# Patient Record
Sex: MALE | Race: WHITE | NOT HISPANIC OR LATINO | Employment: FULL TIME | URBAN - METROPOLITAN AREA
[De-identification: names, ages, dates, MRNs, and addresses within clinical notes are randomized per-mention and may not be internally consistent; named-entity substitution may affect disease eponyms.]

---

## 2020-11-13 ENCOUNTER — OFFICE VISIT (OUTPATIENT)
Dept: ORTHOPEDIC SURGERY | Facility: CLINIC | Age: 40
End: 2020-11-13

## 2020-11-13 VITALS — HEART RATE: 80 BPM | BODY MASS INDEX: 26.37 KG/M2 | OXYGEN SATURATION: 98 % | WEIGHT: 184.2 LBS | HEIGHT: 70 IN

## 2020-11-13 DIAGNOSIS — M25.521 RIGHT ELBOW PAIN: ICD-10-CM

## 2020-11-13 DIAGNOSIS — M77.01 MEDIAL EPICONDYLITIS OF RIGHT ELBOW: Primary | ICD-10-CM

## 2020-11-13 PROCEDURE — 99203 OFFICE O/P NEW LOW 30 MIN: CPT | Performed by: ORTHOPAEDIC SURGERY

## 2020-11-13 NOTE — PROGRESS NOTES
[facility] [place] - Buddy Cuenca MD    Office Visit       Patient Name: Zach Edomnds    Chief Complaint:   Chief Complaint   Patient presents with   • Right Elbow - Pain       Referring Physician: No ref. provider found    History of Present Illness:   Zach Edmonds is a 40 y.o. male who presents with right body part: elbow Reason: pain.  Onset:Onset: atraumatic and gradual in nature. The issue has been ongoing for 3 year(s). Pain is a 6/10 on the pain scale. Pain is described as Pain Characterization: dull, aching and throbbing. Associated symptoms include Symptoms: pain and stiffness. The pain is worse with sleeping and leisure; ice improve the pain. Previous treatments have included: bracing and physical therapy. I have reviewed the patient's history of present illness as noted/entered above.    I have reviewed the patient's past medical history, surgical history, social history, family history, medications, and allergies as noted in the electronic medical record and as noted/entered.  I have reviewed the patient's review of systems as noted/enter and updated as noted in the patient's HPI.      RIGHT ELBOW pain  3 years  Pain and stiffness  Affecting sleep  Treated with bracing and physical therapy  MRI in the past 2019 prior to surgical procedure  Saw Dr. Wilson in the past -- Surgery with Tenex with Dr. Wilson -- September 2019  Prior MRI predated surgical procedure  Possible kettle bell injury, noted playing golf  Medial elbow pain  Voltaren gel  Took off of golf and still painful when tried back    Refractory pain right elbow despite conservative measures and surgical measures.  MRI right elbow recommended    Ballast    Subjective   Subjective      Review of Systems   Constitutional: Negative.  Negative for chills, fatigue and fever.   HENT: Negative.  Negative for congestion and dental problem.    Eyes: Negative.   "Negative for blurred vision.   Respiratory: Negative.  Negative for shortness of breath.    Cardiovascular: Negative.  Negative for leg swelling.   Gastrointestinal: Negative.  Negative for abdominal pain.   Endocrine: Negative.  Negative for polyuria.   Genitourinary: Negative.  Negative for difficulty urinating.   Musculoskeletal: Positive for arthralgias.   Skin: Negative.    Allergic/Immunologic: Negative.    Neurological: Negative.    Hematological: Negative.  Negative for adenopathy.   Psychiatric/Behavioral: Negative.  Negative for behavioral problems.        Past Medical History: History reviewed. No pertinent past medical history.    Past Surgical History:   Past Surgical History:   Procedure Laterality Date   • ELBOW PROCEDURE Right 10/2020       Family History:   Family History   Problem Relation Age of Onset   • Cancer Father        Social History:   Social History     Socioeconomic History   • Marital status:      Spouse name: Not on file   • Number of children: Not on file   • Years of education: Not on file   • Highest education level: Not on file   Tobacco Use   • Smoking status: Never Smoker   • Smokeless tobacco: Never Used   Substance and Sexual Activity   • Alcohol use: Yes     Alcohol/week: 3.0 standard drinks     Types: 3 Cans of beer per week   • Drug use: Never   • Sexual activity: Defer       Medications: No current outpatient medications on file.    Allergies: No Known Allergies    The following portions of the patient's history were reviewed and updated as appropriate: allergies, current medications, past family history, past medical history, past social history, past surgical history and problem list.        Objective    Objective      Vital Signs:   Vitals:    11/13/20 0822   Pulse: 80   SpO2: 98%   Weight: 83.6 kg (184 lb 3.2 oz)   Height: 177.8 cm (70\")       Ortho Exam:  General: no acute distress, comfortable, well-built   vitals reviewed in chart  Head: normocephalic, " atraumatic  Ears: no external drainage noted  Eyes: extraocular muscles appear intact with good tracking  Psych: alert and oriented, normal appearing mood  Vascular: 2+ pulses symmetric, well perfused  Neurologic:  Sensation to light touch intact distally  Spine/Cervical exam: motion preserved  Dermatologic: skin not hot/red/swollen, small prior medial incision from prior procedure  Respiratory: breathing comfortably on room air, no intercostal retractions  Cardiovascular: regular rate and rhythm, well perfused      Specific Musculoskeletal Exam:    SIDE: RIGHT ELBOW    ELBOW:  ROM: 0-140  Pronation: 90  Supination: 80  Varus stress: negative  Valgus stress: negative  Moving valgus stress test: negative  Posterior drawer: negative  Lateral epicondyle: nontender  Medial epicondyle: tenderness to palpation  Negative Tinel's at ulnar nerve  Radial head: negative    Elbow flexion/elbow extension/wrist flexion/wrist extension/hand intrinsics: 5/5 but wrist flexion is painful    Median/radial/ulnar nerves: intact  AIN: intact  PIN: intact      Results Review:   Imaging Results (Last 24 Hours)     Procedure Component Value Units Date/Time    XR Elbow 3+ View Right [[id number]] Resulted: 11/13/20 0852     Updated: 11/13/20 0852    Narrative:      Imaging: elbow x-rays 3 views - AP, lateral, and oblique elbow x-ray views    Side: RIGHT ELBOW    Indication for elbow x-ray 3 views: elbow pain    Comparison: no comparison views available    Findings: no acute bony finding noted, no obvious soft tissue edema    I personally reviewed the above x-rays and discussed with the patient.            Procedures           Assessment / Plan      Assessment/Plan:   Problem List Items Addressed This Visit        Nervous and Auditory    Right elbow pain    Relevant Orders    XR Elbow 3+ View Right (Completed)    MRI Elbow Right Without Contrast       Musculoskeletal and Integument    Medial epicondylitis of right elbow - Primary    Relevant  Orders    MRI Elbow Right Without Contrast          RIGHT Elbow pain - medial epicondylitis     Selective rest/activity modifications recommended while the elbow recovers.    Counseling - I counseled the patient on the diagnosis and treatment plan.  All questions were answered.    Recommended conservative measures    Activity modifications - avoiding painful activities    Wrist immobilizer - discussed wrist immobilizer to limit wrist flexion and to help with activity modifications    Physical therapy -Home exercise program and education materials provided    Corticosteroid injection - a corticosteroid was discussed as a treatment option at the point of maximal tenderness pending results of the MRI.      Right elbow MRI noncontrast is recommended given refractory medial epicondylitis despite conservative measures and despite surgical measures.      Follow Up:   After right elbow MRI      Buddy Cuenca MD, FAAOS  Orthopedic Surgeon  Fellowship Trained Shoulder and Elbow Surgeon  [facility]  Orthopedics and Sports Medicine  [address] [place] [address], [address]  [place] [address]    11/13/20  09:27 EST    Please note that portions of this note may have been completed with a voice recognition program. Efforts were made to edit the dictations, but occasionally words are mistranscribed.

## 2020-11-24 ENCOUNTER — OFFICE VISIT (OUTPATIENT)
Dept: ORTHOPEDIC SURGERY | Facility: CLINIC | Age: 40
End: 2020-11-24

## 2020-11-24 VITALS — HEIGHT: 70 IN | HEART RATE: 81 BPM | OXYGEN SATURATION: 98 % | BODY MASS INDEX: 26.39 KG/M2 | WEIGHT: 184.3 LBS

## 2020-11-24 DIAGNOSIS — M77.01 MEDIAL EPICONDYLITIS OF RIGHT ELBOW: Primary | ICD-10-CM

## 2020-11-24 DIAGNOSIS — M25.521 RIGHT ELBOW PAIN: ICD-10-CM

## 2020-11-24 PROCEDURE — 20550 NJX 1 TENDON SHEATH/LIGAMENT: CPT | Performed by: ORTHOPAEDIC SURGERY

## 2020-11-24 PROCEDURE — 99213 OFFICE O/P EST LOW 20 MIN: CPT | Performed by: ORTHOPAEDIC SURGERY

## 2020-11-24 RX ORDER — LIDOCAINE HYDROCHLORIDE 10 MG/ML
3 INJECTION, SOLUTION EPIDURAL; INFILTRATION; INTRACAUDAL; PERINEURAL
Status: COMPLETED | OUTPATIENT
Start: 2020-11-24 | End: 2020-11-24

## 2020-11-24 RX ADMIN — LIDOCAINE HYDROCHLORIDE 3 ML: 10 INJECTION, SOLUTION EPIDURAL; INFILTRATION; INTRACAUDAL; PERINEURAL at 09:44

## 2020-11-24 NOTE — PROGRESS NOTES
Procedure   Medium Joint Arthrocentesis  Date/Time: 11/24/2020 9:44 AM  Consent given by: patient  Site marked: site marked  Timeout: Immediately prior to procedure a time out was called to verify the correct patient, procedure, equipment, support staff and site/side marked as required   Supporting Documentation  Indications: pain   Procedure Details  Location: elbow - Elbow joint: Right medial epicondyle   Preparation: Patient was prepped and draped in the usual sterile fashion  Needle size: 22 G  Approach: anteromedial  Medications administered: 3 mL lidocaine PF 1% 1 % (1 cc  80 mg Depo Medrol NDC: 5197-6031-59; LOT: [id number]; EXP: 10/01/2021)  Patient tolerance: patient tolerated the procedure well with no immediate complications

## 2020-11-24 NOTE — PROGRESS NOTES
[facility] Office Follow Up       Office Follow Up Visit       Patient Name: Zach Edmonds    Chief Complaint:   Chief Complaint   Patient presents with   • Follow-up     Right Elbow MRI       Referring Physician: No ref. provider found    History of Present Illness:   It has been 11  day(s) since Zach Edmonds's last visit. Zach Edmonds returns to clinic today for F/U: follow-up of rightBody Part: elbowReason: pain. The issue has been ongoing for 3 year(s). Zach Edmonds rates HIS/HER: hispain at 5/10 on the pain scale. Previous/current treatments: bracing, NSAIDS and physical therapy. Current symptoms:Symptoms: pain and stiffness. The pain is worse with working and leisure; ice and heat improves the pain. Overall, he/she: heis doing the same. I have reviewed the patient's history of present illness as noted/entered above.    I have reviewed the patient's past medical history, surgical history, social history, family history, medications, and allergies as noted in the electronic medical record and as noted/entered.  I have reviewed the patient's review of systems as noted/enter and updated as noted in the patient's HPI.      RIGHT ELBOW  MRI f/u  Partial tearing of the common flexor tendon  Set on the conservative course recommended steroid injection, possible follow-up with PRP injection if needed, surgery as a last resort for commonflexor tendon debridement and repair    Subjective   Subjective      Review of Systems   Constitutional: Positive for activity change. Negative for chills, fatigue and fever.   HENT: Negative.  Negative for congestion and dental problem.    Eyes: Negative.  Negative for blurred vision.   Respiratory: Negative.  Negative for shortness of breath.    Cardiovascular: Negative.  Negative for leg swelling.   Gastrointestinal: Negative.  Negative for abdominal pain.   Endocrine: Negative.  Negative for polyuria.    "  Genitourinary: Negative.  Negative for difficulty urinating.   Musculoskeletal: Positive for arthralgias.   Skin: Negative.    Allergic/Immunologic: Negative.    Neurological: Negative.    Hematological: Negative.  Negative for adenopathy.   Psychiatric/Behavioral: Negative.  Negative for behavioral problems.        Past Medical History: History reviewed. No pertinent past medical history.    Past Surgical History:   Past Surgical History:   Procedure Laterality Date   • ELBOW PROCEDURE Right 10/2020       Family History:   Family History   Problem Relation Age of Onset   • Cancer Father        Social History:   Social History     Socioeconomic History   • Marital status:      Spouse name: Not on file   • Number of children: Not on file   • Years of education: Not on file   • Highest education level: Not on file   Tobacco Use   • Smoking status: Never Smoker   • Smokeless tobacco: Never Used   Substance and Sexual Activity   • Alcohol use: Yes     Alcohol/week: 3.0 standard drinks     Types: 3 Cans of beer per week   • Drug use: Never   • Sexual activity: Defer       Medications:   Current Outpatient Medications:   •  Diclofenac Sodium (VOLTAREN) 1 % gel gel, diclofenac 1 % topical gel, Disp: , Rfl:     Allergies: No Known Allergies    The following portions of the patient's history were reviewed and updated as appropriate: allergies, current medications, past family history, past medical history, past social history, past surgical history and problem list.        Objective    Objective      Vital Signs:   Vitals:    11/24/20 0917   Pulse: 81   SpO2: 98%   Weight: 83.6 kg (184 lb 4.9 oz)   Height: 177.8 cm (70\")       Ortho Exam:  Medial epicondyle pain persists just proximal to the medial epicondyle  Full range of motion no elbow instability  Skin intact  SLT intact  Well perfused      Results Review:  Imaging Results (Last 24 Hours)     ** No results found for the last 24 hours. **          MRI right " elbow [facility].  Partial tearing at the common flexor attachment on the medial side.  Tendinopathy and some partial intrasubstance tearing as well on the lateral side of the common extensor tendon.  Mild tendinopathy of the triceps tendon.  Personally reviewed these images completed on 11/20/2020 and reviewed his outside hospital report as well.    Procedures    RIGHT ELBOW MEDIAL EPICONDYLTIS INJECTION:  Risks and benefits of an elbow injection for medial epicondylitis were discussed and the patient desired to proceed. Verbal consent was obtained. The patient understood the risk of infection, potential skin changes, bump in blood glucose especially with diabetes, nerve injury, possibility of increased pain in the short term, and possible incomplete pain relief. Using sterile technique, the elbow tendon sheath was injected from a medial approach with 1mL of 80mg/mL Depo Medrol and 3cc of lidocaine with aspiration prior to injection. The patient tolerated the procedure without difficulty.  CPT CODE 09647 for tendon sheath injection          Assessment / Plan      Assessment/Plan:   Problem List Items Addressed This Visit        Nervous and Auditory    Right elbow pain       Musculoskeletal and Integument    Medial epicondylitis of right elbow - Primary        Right elbow MRI review and corticosteroid injection today.  Counseled on MRI findings and treatment course.    New diagnosis:  Partial tearing Common flexor tendon    Physical therapy recommended and prescription provided as well.  He will continue with bracing.    Follow Up:   Zach will give me a call pending progress and may desire PRP right medial elbow in the future.  We discussed the possibility of surgery in the future we will hold this as a last resort.      Buddy Cuenca MD, FAAOS  Orthopedic Surgeon  Fellowship Trained Shoulder and Elbow Surgeon  [facility]  Orthopedics and Sports Medicine  [address] [place] [address]  [address]  [place] [address]    11/24/20  09:54 EST    Please note that portions of this note may have been completed with a voice recognition program. Efforts were made to edit the dictations, but occasionally words are mistranscribed.

## 2020-12-07 ENCOUNTER — HOSPITAL ENCOUNTER (OUTPATIENT)
Dept: PHYSICAL THERAPY | Facility: HOSPITAL | Age: 40
Setting detail: THERAPIES SERIES
Discharge: HOME | End: 2020-12-07

## 2020-12-07 DIAGNOSIS — M77.01 MEDIAL EPICONDYLITIS, RIGHT ELBOW: Primary | ICD-10-CM

## 2020-12-07 DIAGNOSIS — M25.521 RIGHT ELBOW PAIN: ICD-10-CM

## 2020-12-07 PROCEDURE — 97161 PT EVAL LOW COMPLEX 20 MIN: CPT

## 2020-12-07 NOTE — THERAPY EVALUATION
Outpatient Physical Therapy Ortho Initial Evaluation  [facility]     Patient Name: Zach Edmonds  : 1980  MRN: [id number]  Today's Date: 2020      Visit Date: 2020    Patient Active Problem List   Diagnosis   • Medial epicondylitis of right elbow   • Right elbow pain        No past medical history on file.     Past Surgical History:   Procedure Laterality Date   • ELBOW PROCEDURE Right 10/2020       Visit Dx:     ICD-10-CM ICD-9-CM   1. Medial epicondylitis, right elbow  M77.01 726.31   2. Right elbow pain  M25.521 719.42         Patient History     Row Name 20 1115             History    Chief Complaint  Pain  -      Type of Pain  Elbow pain  -      Date Current Problem(s) Began  -- 3 years  -      Brief Description of Current Complaint  Pt reports 3 year history of medial R elbow pain that he thinks began from a kettlebell lifting injury. Had a procedure performed in Sept of last year to clean out scar tissue along medial tendon, underwent short phase of PT. Returned to lifting/golfing in the spring but notes pain increased at this time. Continues to have pain with certain movements of his wrist and hand, but lifting weights typically does not bother it. Had n&t initially but no longer experiencing. Underwent injection with Dr. Cuenca two weeks ago with notable improvement in pain.  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 Enjoys lifting weights and golfing.   -      Patient seeing anyone else for problem(s)?  Bang  -      How has patient tried to help current problem?  Wrist immobilizer, PT, brace, NSAIDs  -      What clinical tests have you had for this  problem?  MRI  -AC      Results of Clinical Tests  Partial tear common flexor tendon  -AC         Pain     Pain Location  Elbow  -AC      Pain at Present  3  -AC      Pain at Best  3  -AC      Pain at Worst  4  -AC      What Performance Factors Make the Current Problem(s) WORSE?  Buttoning pants, opening jars, golf swing, lifting with elbow flexion, shoveling show/raking leaves  -AC      What Performance Factors Make the Current Problem(s) BETTER?  Elbow in extension  -AC      Difficulties with recreational activities?  Unable to golf, do pull-ups  -AC         Fall Risk Assessment    Any falls in the past year:  No  -AC         Daily Activities    Primary Language  English  -AC      How does patient learn best?  Listening;Reading;Demonstration  -AC      Teaching needs identified  Home Exercise Program  -AC      Patient is concerned about/has problems with  Difficulty with self care (i.e. bathing, dressing, toileting:;Performing home management (household chores, shopping, care of dependents);Repetitive movements of the hand, arm, shoulder;Performing sports, recreation, and play activities  -AC      Does patient have problems with the following?  None  -AC      Barriers to learning  None  -AC      Pt Participated in POC and Goals  Yes  -AC        User Key  (r) = Recorded By, (t) = Taken By, (c) = Cosigned By    Initials Name Provider Type    AC Yee Mir, PT Physical Therapist          PT Ortho     Row Name 12/07/20 9839       Posture/Observations    Alignment Options  Rounded shoulders;Thoracic kyphosis  -AC    Thoracic Kyphosis  Increased  -AC    Rounded Shoulders  Bilateral:;Moderate;Increased  -AC       Quarter Clearing    Quarter Clearing  Upper Quarter Clearing  -AC       DTR- Upper Quarter Clearing    Biceps (C5/6)  Bilateral:;1- Minimal response  -AC    Brachioradialis (C6)  Bilateral:;1- Minimal response  -AC    Triceps (C7)  Bilateral:;1- Minimal response  -AC       Myotomal Screen- Upper Quarter  Clearing    Shoulder flexion (C5)  Bilateral:;5 (Normal)  -AC    Elbow flexion/wrist extension (C6)  Bilateral:;5 (Normal)  -AC    Elbow extension/wrist flexion (C7)  Bilateral:;5 (Normal)  -AC    Finger flexion/ (C8)  Bilateral:;4- (Good -) Mild pain  -AC    Finger abduction (T1)  Bilateral:;4 (Good)  -AC       Special Tests/Palpation    Special Tests/Palpation  Elbow/Forearm  -AC       Elbow Accessory Motions    Elbow Accesssory Motions Tested?  Yes  -AC       Elbow/Forearm Special Tests    Medial Epicondyle Test (Golfer's Elbow)  Right:;Positive  -AC       Elbow/Forearm Palpation    Elbow/Forearm Palpation?  Yes  -AC    Medial Epicondyle  Right:;Tender  -AC    Flexor Tendon  Right:;Tender  -AC    Pronator Teres  Right:;Guarded/taut  -AC       General ROM    RT Upper Ext  Rt Elbow Extension/Flexion;Rt Elbow Supination;Rt Elbow Pronation  -AC    LT Upper Ext  Lt Elbow Extension/Flexion;Lt Elbow Supination;Lt Elbow Pronation  -AC       Right Upper Ext    Rt Elbow Extension/Flexion AROM  6-145  -AC    Rt Elbow Supination AROM  90  -AC    Rt Elbow Pronation AROM  80 with strain  -AC       MMT (Manual Muscle Testing)    Rt Upper Ext  Rt Shoulder Internal Rotation;Rt Shoulder External Rotation;Rt Forearm Supination;Rt Forearm Pronation;Rt Shoulder ABduction  -AC    Lt Upper Ext  Lt Shoulder ABduction;Lt Shoulder Internal Rotation;Lt Shoulder External Rotation;Lt Forearm Supination;Lt Forearm Pronation  -AC       MMT Right Upper Ext    Rt Shoulder ABduction MMT, Gross Movement  (5/5) normal  -AC    Rt Shoulder Internal Rotation MMT, Gross Movement  (5/5) normal  -AC    Rt Shoulder External Rotation MMT, Gross Movement  (4+/5) good plus  -AC    Rt Forearm Supination MMT, Gross Movement  (5/5) normal  -AC    Rt Forearm Pronation MMT, Gross Movement  (5/5) normal  -AC    Rt Upper Extremity Comments   Mild strain with ER  -AC       MMT Left Upper Ext    Lt Shoulder ABduction MMT, Gross Movement  (5/5) normal  -AC    Lt  Shoulder Internal Rotation MMT, Gross Movement  (5/5) normal  -AC    Lt Shoulder External Rotation MMT, Gross Movement  (5/5) normal  -AC    Lt Forearm Supination MMT, Gross Movement  (5/5) normal  -AC    Lt Forearm Pronation MMT, Gross Movement  (5/5) normal  -AC        Strength Right    Right  Test 1  110  -AC     Strength Average Right  110  -AC        Strength Left    Left  Test 1  118  -AC     Strength Average Left  118  -AC       Sensation    Sensation WNL?  WNL  -AC       Hand  Strength     Strength Affected Side  Bilateral  -AC      User Key  (r) = Recorded By, (t) = Taken By, (c) = Cosigned By    Initials Name Provider Type    AC Yee Mir, PT Physical Therapist                      Therapy Education  Education Details: Pt educated on findings, POC, and provided HEP including: rubber band resisted thumb extension, band radial deviation, band pronation, full pull aparts, rows with 3 wrist positions, and D2E (green and red TB provided).  Given: HEP  Program: New  How Provided: Verbal, Demonstration, Written  Provided to: Patient  Level of Understanding: Verbalized     PT OP Goals     Row Name 12/07/20 1115          PT Short Term Goals    STG Date to Achieve  12/28/20  -AC     STG 1  Decrease R elbow pain with daily activities by > or = 50%.  -AC     STG 1 Progress  New  -AC     STG 2  Perform independent HEP to improve ROM/strength and reduce pain with daily activities.  -AC     STG 2 Progress  New  -AC     STG 3  Improve R elbow extension to at least 2 deg.  -AC     STG 3 Progress  New  -AC     STG 4  Allow for pain-free, full pronation.  -AC     STG 4 Progress  New  -AC     STG 5  Improve  strength to at least equivalent of LUE.  -AC     STG 5 Progress  New  -AC        Long Term Goals    LTG Date to Achieve  01/18/21  -AC     LTG 1  Decrease R elbow pain by > or = 75%.  -AC     LTG 1 Progress  New  -AC     LTG 2  Improve QDASH to < or = 12%.  -AC     LTG 2  Progress  New  -AC     LTG 3  Enable ability to button pants, rake leaves, open jars, and lift objects with min-no difficulty or pain.  -AC     LTG 3 Progress  New  -AC     LTG 4  Enable ability to perform pull ups and golf swing with min-no difficulty or pain.  -AC     LTG 4 Progress  New  -AC        Time Calculation    PT Goal Re-Cert Due Date  03/07/21  -AC       User Key  (r) = Recorded By, (t) = Taken By, (c) = Cosigned By    Initials Name Provider Type    AC Yee Mir, PT Physical Therapist          PT Assessment/Plan     Row Name 12/07/20 1115          PT Assessment    Functional Limitations  Limitation in home management;Limitations in community activities;Performance in self-care ADL;Performance in leisure activities  -AC     Impairments  Impaired muscle length;Impaired muscle endurance;Impaired muscle power;Joint integrity;Joint mobility;Muscle strength;Pain;Poor body mechanics;Posture;Range of motion  -AC     Assessment Comments  Pt presents with improving symptoms of low complexity with signs consistent with R medial epicondylosis. Pt has long history of pain and underwent surgery last fall, however reports continued pain/limitations in daily activities. Pt demonstrates some weakness in RUE  strength, decreased ROM, and mild joint restrictions. PT intervention is warranted to restore mobility/strength, reduce pain, and maximize function.  -AC     Please refer to paper survey for additional self-reported information  Yes  -AC     Rehab Potential  Good  -AC     Patient/caregiver participated in establishment of treatment plan and goals  Yes  -AC     Patient would benefit from skilled therapy intervention  Yes  -AC        PT Plan    PT Frequency  1x/week  -AC     Predicted Duration of Therapy Intervention (PT)  8 wks  -AC     Planned CPT's?  PT EVAL LOW COMPLEXITY: 44460;PT RE-EVAL: 06367;PT THER PROC EA 15 MIN: 21567;PT THER ACT EA 15 MIN: 13247;PT MANUAL THERAPY EA 15 MIN: 82765;PT NEUROMUSC  RE-EDUCATION EA 15 MIN: 49755;PT SELF CARE/HOME MGMT/TRAIN EA 15: 77208;PT HOT/COLD PACK WC NONMCARE: 92226;PT THER SUPP EA 15 MIN;PT THER MASS EA 15 MIN: 87902;PT THER PROC GROUP: 58813  -AC     PT Plan Comments  Progress forearm/wrist/upper body strengthening as tolerated. Progress to cable rows, lat pulldowns, etc. Consider TDN if indicated.  -AC       User Key  (r) = Recorded By, (t) = Taken By, (c) = Cosigned By    Initials Name Provider Type    AC Yee Mir, PT Physical Therapist                              Outcome Measure Options: Quick DASH  Quick DASH  Open a tight or new jar.: Mild Difficulty  Do heavy household chores (e.g., wash walls, wash floors): Mild Difficulty  Carry a shopping bag or briefcase: No Difficulty  Wash your back: No Difficulty  Use a knife to cut food: No Difficulty  Recreational activities in which you take some force or impact through your arm, should or hand (e.g. golf, hammering, tennis, etc.): Moderate Difficulty  During the past week, to what extent has your arm, shoulder, or hand problem interfered with your normal social activites with family, friends, neighbors or groups?: Slightly  During the past week, were you limited in your work or other regular daily activities as a result of your arm, shoulder or hand problem?: Slightly Limited  Arm, Shoulder, or hand pain: Mild  Tingling (pins and needles) in your arm, shoulder, or hand: Mild  During the past week, how much difficulty have you had sleeping because of the pain in your arm, shoulder or hand?: Mild Difficulty  Number of Questions Answered: 11  Quick DASH Score: 20.45         Time Calculation:     Start Time: 1115     Therapy Charges for Today     Code Description Service Date Service Provider Modifiers Qty    76466804824 HC PT EVAL LOW COMPLEXITY 4 12/7/2020 Yee Mir, PT GP 1          PT G-Codes  Outcome Measure Options: Quick DASH  Quick DASH Score: 20.45         Yee Mir PT  12/7/2020

## 2020-12-28 ENCOUNTER — HOSPITAL ENCOUNTER (OUTPATIENT)
Dept: PHYSICAL THERAPY | Facility: HOSPITAL | Age: 40
Setting detail: THERAPIES SERIES
Discharge: HOME | End: 2020-12-28

## 2020-12-28 DIAGNOSIS — M77.01 MEDIAL EPICONDYLITIS, RIGHT ELBOW: Primary | ICD-10-CM

## 2020-12-28 DIAGNOSIS — M25.521 RIGHT ELBOW PAIN: ICD-10-CM

## 2020-12-28 PROCEDURE — 97110 THERAPEUTIC EXERCISES: CPT

## 2020-12-28 NOTE — THERAPY TREATMENT NOTE
Outpatient Physical Therapy Ortho Treatment Note   [facility]     Patient Name: Zach Edmonds  : 1980  MRN: [id number]  Today's Date: 2020      Visit Date: 2020    Visit Dx:    ICD-10-CM ICD-9-CM   1. Medial epicondylitis, right elbow  M77.01 726.31   2. Right elbow pain  M25.521 719.42       Patient Active Problem List   Diagnosis   • Medial epicondylitis of right elbow   • Right elbow pain        No past medical history on file.     Past Surgical History:   Procedure Laterality Date   • ELBOW PROCEDURE Right 10/2020                       PT Assessment/Plan     Row Name 20 1115          PT Assessment    Assessment Comments  Pt continues to note no pain in L elbow since undergoing injection and doing HEP. Reviewed and progressed exercises in clinic today with good tolerance, including trunk/hip mobility to reduce risk of reinjury. Pt will follow up within 30d if needed.  -AC        PT Plan    PT Plan Comments  Follow up within 30d if indicated, otherwise d/c with HEP.  -AC       User Key  (r) = Recorded By, (t) = Taken By, (c) = Cosigned By    Initials Name Provider Type    AC Yee Mir, PT Physical Therapist            OP Exercises     Row Name 20 1112             Subjective Comments    Subjective Comments  Pt states he is doing well with home exercises. Lifted boxes of [place] decorations without pain. Wakes up with arm folded inwards but no pain.  -AC         Subjective Pain    Able to rate subjective pain?  yes  -AC      Pre-Treatment Pain Level  0  -AC      Post-Treatment Pain Level  0  -AC         Total Minutes    73716 - PT Therapeutic Exercise Minutes  30  -AC         Exercise 1    Exercise Name 1  D2E  -AC      Reps 1  15  -AC      Additional Comments  GR  -AC         Exercise 2    Exercise Name 2  Cable row three ways  -AC      Reps 2  10 ea  -AC      Additional Comments  3 plt ea side  -AC         Exercise 3    Exercise Name 3  Kneeling chops  -AC      Reps  3  10/10  -      Additional Comments  4 plt  -AC         Exercise 4    Exercise Name 4  Seated cable single arm lat pulldown  -AC      Reps 4  10  -AC      Additional Comments  4 plt  -AC         Exercise 5    Exercise Name 5  Golf trunk/hip mobility drills  -AC      Time 5  5 min  -AC      Additional Comments  Video from [facility]  -        User Key  (r) = Recorded By, (t) = Taken By, (c) = Cosigned By    Initials Name Provider Type    AC Yee Mir, PT Physical Therapist                                          Time Calculation:   Start Time: 1115  Therapy Charges for Today     Code Description Service Date Service Provider Modifiers Qty    [id number]  PT THER PROC EA 15 MIN 12/28/2020 Yee Mir, PT GP 2                    Yee Mir PT  12/28/2020

## 2021-01-21 ENCOUNTER — IMMUNIZATION (OUTPATIENT)
Dept: VACCINE CLINIC | Facility: HOSPITAL | Age: 41
End: 2021-01-21

## 2021-01-21 PROCEDURE — 0001A: CPT | Performed by: INTERNAL MEDICINE

## 2021-01-21 PROCEDURE — 91300 HC SARSCOV02 VAC 30MCG/0.3ML IM: CPT | Performed by: INTERNAL MEDICINE

## 2021-02-14 ENCOUNTER — IMMUNIZATION (OUTPATIENT)
Dept: VACCINE CLINIC | Facility: HOSPITAL | Age: 41
End: 2021-02-14

## 2021-02-14 PROCEDURE — 91300 HC SARSCOV02 VAC 30MCG/0.3ML IM: CPT | Performed by: INTERNAL MEDICINE

## 2021-02-14 PROCEDURE — 0002A: CPT | Performed by: INTERNAL MEDICINE

## 2021-03-02 ENCOUNTER — DOCUMENTATION (OUTPATIENT)
Dept: PHYSICAL THERAPY | Facility: HOSPITAL | Age: 41
End: 2021-03-02

## 2021-03-02 DIAGNOSIS — M77.01 MEDIAL EPICONDYLITIS, RIGHT ELBOW: Primary | ICD-10-CM

## 2021-03-02 DIAGNOSIS — M25.521 RIGHT ELBOW PAIN: ICD-10-CM

## 2021-03-02 DIAGNOSIS — M77.01 MEDIAL EPICONDYLITIS OF RIGHT ELBOW: Primary | ICD-10-CM

## 2021-03-02 NOTE — THERAPY DISCHARGE NOTE
Outpatient Physical Therapy Discharge Summary         Patient Name: Zach Edmonds  : 1980  MRN: [id number]    Today's Date: 3/2/2021    Visit Dx:    ICD-10-CM ICD-9-CM   1. Medial epicondylitis, right elbow  M77.01 726.31   2. Right elbow pain  M25.521 719.42       PT OP Goals     Row Name 21 1543          PT Short Term Goals    STG Date to Achieve  20  -AC     STG 1  Decrease R elbow pain with daily activities by > or = 50%.  -AC     STG 1 Progress  Met  -AC     STG 2  Perform independent HEP to improve ROM/strength and reduce pain with daily activities.  -AC     STG 2 Progress  Met  -AC     STG 3  Improve R elbow extension to at least 2 deg.  -AC     STG 3 Progress  Met  -AC     STG 4  Allow for pain-free, full pronation.  -AC     STG 4 Progress  Partially Met  -AC     STG 5  Improve  strength to at least equivalent of LUE.  -AC     STG 5 Progress  Not Met  -AC        Long Term Goals    LTG Date to Achieve  21  -AC     LTG 1  Decrease R elbow pain by > or = 75%.  -AC     LTG 1 Progress  Not Met  -AC     LTG 2  Improve QDASH to < or = 12%.  -AC     LTG 2 Progress  Not Met  -AC     LTG 3  Enable ability to button pants, rake leaves, open jars, and lift objects with min-no difficulty or pain.  -AC     LTG 3 Progress  Partially Met  -AC     LTG 4  Enable ability to perform pull ups and golf swing with min-no difficulty or pain.  -AC     LTG 4 Progress  Not Met  -AC        Time Calculation    PT Goal Re-Cert Due Date  21  -AC       User Key  (r) = Recorded By, (t) = Taken By, (c) = Cosigned By    Initials Name Provider Type    Yee Dubois, PT Physical Therapist          OP PT Discharge Summary  Date of Discharge: 21  Reason for Discharge: Maximum functional potential achieved  Outcomes Achieved: Patient able to partially acheive established goals  Discharge Destination: Home with home program, Home without follow-up  Discharge Instructions/Additional Comments:  Pt was seen for IE and one follow-up to address medial epicondylitis. Pt reported notable improvement with exercises following injection and gradual return to function. Pt provided progressed HEP at last session and instructed to return within 30d if needed. Will d/c due to lapse in treatment.      Time Calculation:   Start Time: 1543                Yee Mir, PT  3/2/2021

## 2021-03-16 ENCOUNTER — CLINICAL SUPPORT (OUTPATIENT)
Dept: ORTHOPEDIC SURGERY | Facility: CLINIC | Age: 41
End: 2021-03-16

## 2021-03-16 DIAGNOSIS — M77.01 MEDIAL EPICONDYLITIS OF RIGHT ELBOW: Primary | ICD-10-CM

## 2021-03-16 PROCEDURE — 0232T NJX PLATELET PLASMA: CPT | Performed by: ORTHOPAEDIC SURGERY

## 2021-03-16 RX ORDER — FLUTICASONE PROPIONATE 50 MCG
SPRAY, SUSPENSION (ML) NASAL
COMMUNITY
Start: 2021-02-25

## 2021-03-16 RX ORDER — LEVOCETIRIZINE DIHYDROCHLORIDE 5 MG/1
5 TABLET, FILM COATED ORAL EVERY EVENING
COMMUNITY
Start: 2021-02-25

## 2021-03-16 RX ORDER — TRAZODONE HYDROCHLORIDE 50 MG/1
TABLET ORAL
COMMUNITY
Start: 2021-03-04

## 2021-03-16 RX ORDER — LORAZEPAM 1 MG/1
TABLET ORAL
COMMUNITY
Start: 2021-01-31

## 2021-03-16 RX ORDER — PROMETHAZINE HYDROCHLORIDE AND CODEINE PHOSPHATE 6.25; 1 MG/5ML; MG/5ML
SYRUP ORAL
COMMUNITY

## 2021-03-16 NOTE — PROGRESS NOTES
Procedure   Medium Joint Arthrocentesis: R elbow  Date/Time: 3/16/2021 8:04 AM  Consent given by: patient  Site marked: site marked  Timeout: Immediately prior to procedure a time out was called to verify the correct patient, procedure, equipment, support staff and site/side marked as required   Supporting Documentation  Indications: pain   Procedure Details  Location: elbow - R elbow  Preparation: Patient was prepped and draped in the usual sterile fashion  Needle size: 23 G  Approach: Medial.  Medication group details: (Arthrex ACP PRP Kit, LOT: [id number] EXP: 09/30/2021) Saba 15mL of blood from the left arm. Spun the blood down and saba back 4cc of plasma and injected it into the right elbow.   Patient tolerance: patient tolerated the procedure well with no immediate complications      Right ELBOW MEDIAL EPICONDYLTIS INJECTION:  Risks and benefits of an elbow injection for medial epicondylitis were discussed and the patient desired to proceed with right elbow PRP injection. Verbal consent was obtained. The patient understood the risk of infection, potential skin changes, bump in blood glucose especially with diabetes, nerve injury, possibility of increased pain in the short term, and possible incomplete pain relief. Using sterile technique, the elbow tendon sheath was injected from a medial approach with 4cc of PRP. The patient tolerated the procedure without difficulty.  CPT CODE PRP 0232T for tendon sheath injection

## 2025-08-19 ENCOUNTER — OFFICE VISIT (OUTPATIENT)
Dept: NEUROSURGERY | Facility: CLINIC | Age: 45
End: 2025-08-19
Payer: COMMERCIAL

## 2025-08-19 ENCOUNTER — HOSPITAL ENCOUNTER (OUTPATIENT)
Dept: GENERAL RADIOLOGY | Facility: HOSPITAL | Age: 45
Discharge: HOME | End: 2025-08-19
Admitting: PHYSICIAN ASSISTANT
Payer: COMMERCIAL

## 2025-08-19 VITALS — BODY MASS INDEX: 25.45 KG/M2 | WEIGHT: 177.8 LBS | TEMPERATURE: 97.5 F | HEIGHT: 70 IN

## 2025-08-19 DIAGNOSIS — M54.17 LUMBOSACRAL RADICULOPATHY AT L5: Primary | ICD-10-CM

## 2025-08-19 DIAGNOSIS — M54.17 LUMBOSACRAL RADICULOPATHY AT L5: ICD-10-CM

## 2025-08-19 PROCEDURE — 72114 X-RAY EXAM L-S SPINE BENDING: CPT

## 2025-08-19 RX ORDER — LEVOCETIRIZINE DIHYDROCHLORIDE 5 MG/1
5 TABLET, FILM COATED ORAL EVERY EVENING
COMMUNITY